# Patient Record
Sex: FEMALE | Race: WHITE | NOT HISPANIC OR LATINO | Employment: OTHER | URBAN - METROPOLITAN AREA
[De-identification: names, ages, dates, MRNs, and addresses within clinical notes are randomized per-mention and may not be internally consistent; named-entity substitution may affect disease eponyms.]

---

## 2017-05-24 NOTE — PATIENT DISCUSSION
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 NO NEW HOLES OR TEARS SEEN ON DILATED EXAM TODAY.  RETINAL DETACHMENT SIGNS AND SYMPTOMS REVIEWED

## 2017-05-24 NOTE — PATIENT DISCUSSION
VISUAL SYMPTOMS HAVE REMAINED STABLE. DISCUSSED MULTIFACTORIAL ETIOLOGY, INCLUDING MILD ERM OU, POSSIBLE EARLY GLAUCOMA OU, AND/OR OPTIC NEUROPATHY. MRI BRAIN/ORBITS WITH DWI NOT REQUIRED AT THIS TIME, WILL DEFER UNLESS SHE DEVELOPS PROGRESSIVE SYMPTOMS CONSISTENT WITH OPTIC NEUROPATHY.

## 2017-05-24 NOTE — PATIENT DISCUSSION
EPIMACULAR MEMBRANE, OU:  PATIENT NOT FUNCTIONALLY BOTHERED. SURGERY NOT RECOMMENDED AT THIS TIME. RETURN AS SCHEDULED FOR OCT / EVALUATION.

## 2017-10-16 NOTE — PATIENT DISCUSSION
Classic Migraine Counseling:  I discussed with the patient the pathophysiology and various clinical presentations of migraine. I discussed migraine therapy in general terms and suggested consultation with the primary care physician for specific treatment recommendations. I reassured the patient that these symptoms are unrelated to any problem related to eye health.

## 2017-10-16 NOTE — PATIENT DISCUSSION
OPHTHALMIC MIGRAINE:  +VISUAL AURAS. DENIES ANY SIGNS/SYMPTOMS OF GCA. RETURN TO PRIMARY CARE DOCTOR FOR MIGRAINE MANAGEMENT AND FOLLOW UP AS SCHEDULED.

## 2017-10-16 NOTE — PATIENT DISCUSSION
RETINA IS ATTACHED OU: PVD OU; LATTICE OS; NO NEW HOLES OR TEARS SEEN ON DILATED EXAM TODAY.  RETINAL DETACHMENT SIGNS AND SYMPTOMS REVIEWED

## 2017-10-16 NOTE — PATIENT DISCUSSION
Continue: Systane (PF) (peg 400-propylene glycol (pf)): dropperette: 0.4-0.3% 1 drop twice a day as needed into both eyes

## 2018-01-23 NOTE — PATIENT DISCUSSION
CANNOT RULE OUT ISCHEMIC OPTIC NEUROPATHY, FOLLOW AS LOW TENSION GLAUCOMA - NORMAL MRI BRAIN LAST OCTOBER.  CONTINUE FU VF

## 2018-07-27 NOTE — PATIENT DISCUSSION
CANNOT RULE OUT ISCHEMIC OPTIC NEUROPATHY, FOLLOW AS LOW TENSION GLAUCOMA - NORMAL MRI BRAIN LAST OCTOBER. CONTINUE FU VF.  GOAL IOP &lt;16

## 2019-01-16 NOTE — PATIENT DISCUSSION
RETINA IS ATTACHED OU: PVD OU; LATTICE DEGENERATION OS; NO HOLES OR TEARS SEEN ON DILATED EXAM TODAY.  RETINAL DETACHMENT SIGNS AND SYMPTOMS REVIEWED

## 2019-07-29 NOTE — PATIENT DISCUSSION
NTG (F/U) Counseling: Intraocular pressure is within acceptable limits. I have reviewed the regimen of glaucoma drops with the patient and have stressed the importance of compliance. Patient instructed to continue current medication and return for follow-up as scheduled.

## 2019-07-29 NOTE — PATIENT DISCUSSION
LATTICE DEGENERATION, OU: NO HOLES, NO TEARS 360 DEGREES WITH INDIRECT EXAM. RETURN FOR FOLLOW-UP AS SCHEDULED.

## 2019-10-28 NOTE — PATIENT DISCUSSION
CANNOT RULE OUT ISCHEMIC OPTIC NEUROPATHY, FOLLOW AS LOW TENSION GLAUCOMA - WILL SEND COPY OF NOTES TO [facility] (PT HAS APPT ON 12TH).  CONSIDER MRI BRAIN WITH DWI IF SYMPTOMS WORSEN
COUNSELING:
GOAL IOP &lt;16
General:
NTG OU: REPEAT VF TESTING SHOWS INFERIOR NONSPECIFIC CHANGES CHANGES OD&gt;OS. PT ALSO ENDORSES NEUROLOGIC SYMPTOMS - DR LANDEROS NEUROLOGIST TREATING FOR NEUROPATHY. NO EVIDENCE OF MS.
NTG, OU Counseling:  I have explained to the patient at length regarding the diagnosis of normal tension glaucoma and its pathophysiology. I have discussed the various treatment options including medications and surgery. I recommend that the patient begin medical treatment. I emphasized to the patient the importance of compliance with treatment and follow-up appointments.
H/O inguinal hernia repair

## 2019-11-05 ENCOUNTER — ESTABLISHED COMPREHENSIVE EXAM (OUTPATIENT)
Dept: URBAN - METROPOLITAN AREA CLINIC 32 | Facility: CLINIC | Age: 69
End: 2019-11-05

## 2019-11-05 DIAGNOSIS — H25.13: ICD-10-CM

## 2019-11-05 PROCEDURE — G9903 PT SCRN TBCO ID AS NON USER: HCPCS

## 2019-11-05 PROCEDURE — 92014 COMPRE OPH EXAM EST PT 1/>: CPT

## 2019-11-05 PROCEDURE — 92015 DETERMINE REFRACTIVE STATE: CPT

## 2019-11-05 PROCEDURE — G8785 BP SCRN NO PERF AT INTERVAL: HCPCS

## 2019-11-05 PROCEDURE — G8428 CUR MEDS NOT DOCUMENT: HCPCS

## 2019-11-05 PROCEDURE — 1036F TOBACCO NON-USER: CPT

## 2019-11-05 ASSESSMENT — VISUAL ACUITY
OS_BAT: 20/400
OD_CC: J2
OD_CC: 20/25-1
OS_CC: 20/25-1
OD_SC: 20/60
OS_CC: J2
OD_BAT: 20/400
OS_SC: J5
OD_SC: J3
OS_SC: 20/70

## 2019-11-05 ASSESSMENT — KERATOMETRY
OS_AXISANGLE_DEGREES: 79
OS_K2POWER_DIOPTERS: 43.25
OD_AXISANGLE2_DEGREES: 15
OD_AXISANGLE_DEGREES: 105
OS_K1POWER_DIOPTERS: 42.25
OD_K2POWER_DIOPTERS: 43.5
OD_K1POWER_DIOPTERS: 42
OS_AXISANGLE2_DEGREES: 169

## 2019-11-05 ASSESSMENT — TONOMETRY
OD_IOP_MMHG: 14
OS_IOP_MMHG: 16

## 2020-06-18 ENCOUNTER — ESTABLISHED COMPREHENSIVE EXAM (OUTPATIENT)
Dept: URBAN - METROPOLITAN AREA CLINIC 32 | Facility: CLINIC | Age: 70
End: 2020-06-18

## 2020-06-18 DIAGNOSIS — H35.363: ICD-10-CM

## 2020-06-18 DIAGNOSIS — E11.9: ICD-10-CM

## 2020-06-18 PROCEDURE — 92134 CPTRZ OPH DX IMG PST SGM RTA: CPT

## 2020-06-18 PROCEDURE — 92014 COMPRE OPH EXAM EST PT 1/>: CPT

## 2020-06-18 ASSESSMENT — KERATOMETRY
OS_K2POWER_DIOPTERS: 43.25
OD_K1POWER_DIOPTERS: 42
OS_AXISANGLE2_DEGREES: 169
OD_K2POWER_DIOPTERS: 43.5
OD_AXISANGLE_DEGREES: 105
OD_AXISANGLE2_DEGREES: 15
OS_AXISANGLE_DEGREES: 79
OS_K1POWER_DIOPTERS: 42.25

## 2020-06-18 ASSESSMENT — TONOMETRY
OD_IOP_MMHG: 12
OS_IOP_MMHG: 14

## 2020-06-18 ASSESSMENT — VISUAL ACUITY
OU_SC: 20/30+2
OS_SC: 20/40-1
OD_SC: 20/40-2

## 2020-10-27 NOTE — PATIENT DISCUSSION
10/27/2020OS+1.75+2.2547+[id number]/20 -&nbsp;SN &nbsp; &nbsp; bmb
CANNOT RULE OUT ISCHEMIC OPTIC NEUROPATHY, FOLLOW AS LOW TENSION GLAUCOMA - NORMAL MRI BRAIN LAST OCTOBER. CONTINUE FU VF.  GOAL IOP &lt;16
COUNSELING:
Comments:ANY COMBO
Continue: Systane (PF) (peg 400-propylene glycol (pf)): dropperette: 0.4-0.3% 1 drop twice a day as needed into both eyes
Dry Eye Syndrome Counseling: I have discussed the diagnosis and the pathophysiology of this disease with the patient. Eyelid pathology and systemic illnesses such as Sjogren?s disease or rheumatoid arthritis may contribute to severity. Vision may be limited by dry eye, and symptoms exacerbated by environmental factors such as smoke, wind, or prolonged eye use. Lifestyle habits and environmental factors contributing to dry eyes have been reviewed with the patient. Daily prescribed and over the counter medications, along with their potential contributions to dry eye symptoms, have been discussed. Treatment options include, but are not limited to, artificial tears, punctal plugs, topical cyclosporine, oral omega-3 supplements, Lipiflow, moisture goggles, and lubricating ointments. I stressed the importance of compliance with treatment.
EPIRETINAL MEMBRANE, OU. NOT VISUALLY SIGNIFICANT TO THE PATIENT AT THIS TIME. FOLLOW.
Epiretinal Membrane Counseling: The diagnosis and natural history of epiretinal membrane was discussed with the patient including the risk of progression with retinal traction resulting in visual distortion. The patient is instructed to call back immediately if any vision changes, and keep follow up as scheduled.
General:
Glasses Prescribed:
HORDEOLUM OS: PRESCRIBED WARM COMPRESSES, EYELID SCRUBS AND EMYCIN.
LATTICE DEGENERATION, OU: NO HOLES, NO TEARS 360 DEGREES WITH INDIRECT EXAM. RETURN FOR FOLLOW-UP AS SCHEDULED.
Lattice Degeneration Counseling: I have discussed the diagnosis and pathophysiology with the patient and the possibility of a retinal tear or detachment. The signs/symptoms of a retinal tear/detachment were reviewed to include but not limited to redness, discharge, pain, increase or change in flashes of light, increase or change in floaters, decreased vision, part of the vision missing, veils or any other ocular concerns. I have further explained not all patients who develop a tear or detachment have notable symptoms, therefore, compliance with return visits are necessary. The patient was instructed to contact us immediately if they notice any of the signs or symptoms of retinal detachment as noted above as the prognosis for any potential treatment options may be time related. Return for follow-up as scheduled.
Lens Material:
MILD DRY EYES: PRESCRIBED ARTIFICIAL TEARS BID - QID, OU RETURN FOR FOLLOW-UP AS SCHEDULED OR SOONER IF SYMPTOMS WORSEN.
Medications:
NTG (F/U) Counseling: Intraocular pressure is within acceptable limits. I have reviewed the regimen of glaucoma drops with the patient and have stressed the importance of compliance. Patient instructed to continue current medication and return for follow-up as scheduled.
NTG OU: GOOD IOP, IMPROVED RNFL OCT. NO EVIDENCE OF MS.
New Prescription: erythromycin (erythromycin): ointment: 5 mg/gram (0.5 %) a small amount at bedtime into both eyes 10-
Progressive - Daily wearOD+1.75+3.70640+[id number]/20&nbsp;SN &nbsp; &nbsp; bmb
Slab Off:No
Vertex Distance:
spherecylinderaxisaddprismvertexVAInt VANVExaminer
32.9

## 2021-04-26 NOTE — PATIENT DISCUSSION
NTG OU: GOOD IOP,  NO EVIDENCE OF MS. CANNOT RULE OUT ISCHEMIC OPTIC NEUROPATHY, FOLLOW AS LOW TENSION GLAUCOMA - NORMAL MRI BRAIN LAST OCTOBER. CONTINUE FU VF.  GOAL IOP &lt;16

## 2021-06-21 ENCOUNTER — ESTABLISHED COMPREHENSIVE EXAM (OUTPATIENT)
Dept: URBAN - METROPOLITAN AREA CLINIC 32 | Facility: CLINIC | Age: 71
End: 2021-06-21

## 2021-06-21 DIAGNOSIS — H43.811: ICD-10-CM

## 2021-06-21 DIAGNOSIS — H25.13: ICD-10-CM

## 2021-06-21 DIAGNOSIS — E11.9: ICD-10-CM

## 2021-06-21 PROCEDURE — 92015 DETERMINE REFRACTIVE STATE: CPT

## 2021-06-21 PROCEDURE — 92014 COMPRE OPH EXAM EST PT 1/>: CPT

## 2021-06-21 ASSESSMENT — VISUAL ACUITY
OD_CC: J2
OS_CC: 20/40
OS_CC: J1
OD_CC: 20/25-1

## 2021-06-21 ASSESSMENT — KERATOMETRY
OD_AXISANGLE_DEGREES: 108
OS_AXISANGLE_DEGREES: 83
OD_K2POWER_DIOPTERS: 43.50
OS_AXISANGLE2_DEGREES: 173
OD_AXISANGLE2_DEGREES: 18
OD_K1POWER_DIOPTERS: 42.25
OS_K1POWER_DIOPTERS: 42.50
OS_K2POWER_DIOPTERS: 43.25

## 2021-06-21 ASSESSMENT — TONOMETRY
OD_IOP_MMHG: 15
OS_IOP_MMHG: 15

## 2021-11-29 NOTE — PATIENT DISCUSSION
Continue: Systane (PF) (peg 400-propylene glycol (pf)): dropperette: 0.4-0.3% 1 drop twice a day as needed into both eyes Universal Safety Interventions

## 2022-02-25 NOTE — PATIENT DISCUSSION
GOOD IOP, NO EVIDENCE OF MS. CANNOT RULE OUT ISCHEMIC OPTIC NEUROPATHY, FOLLOW AS LOW TENSION GLAUCOMA - NORMAL MRI BRAIN LAST OCTOBER. CONTINUE FU VF. GOAL IOP <16.

## 2022-06-04 ENCOUNTER — TELEPHONE ENCOUNTER (OUTPATIENT)
Dept: URBAN - METROPOLITAN AREA CLINIC 68 | Facility: CLINIC | Age: 72
End: 2022-06-04

## 2022-06-05 ENCOUNTER — TELEPHONE ENCOUNTER (OUTPATIENT)
Dept: URBAN - METROPOLITAN AREA CLINIC 68 | Facility: CLINIC | Age: 72
End: 2022-06-05

## 2022-06-05 RX ORDER — RISEDRONATE SODIUM 30.1; 4.9 MG/1; MG/1
TABLET, FILM COATED ORAL
Status: ACTIVE | COMMUNITY
Start: 2010-07-20

## 2022-06-25 ENCOUNTER — TELEPHONE ENCOUNTER (OUTPATIENT)
Age: 72
End: 2022-06-25

## 2022-06-26 ENCOUNTER — TELEPHONE ENCOUNTER (OUTPATIENT)
Age: 72
End: 2022-06-26

## 2022-06-26 RX ORDER — SODIUM PHOSPHATE, MONOBASIC, MONOHYDRATE, SODIUM PHOSPHATE, DIBASIC ANHYDROUS 1.102; .398 G/1; G/1
OSMOPREP(    AS DIRECTED ON PREP SHEET ) ACTIVE -HX ENTRY TABLET ORAL
Status: ACTIVE | COMMUNITY
Start: 2010-07-20

## 2022-06-26 RX ORDER — RISEDRONATE SODIUM 30.1; 4.9 MG/1; MG/1
TABLET, FILM COATED ORAL
Status: ACTIVE | COMMUNITY
Start: 2010-07-20

## 2022-06-26 RX ORDER — ELECTROLYTES/DEXTROSE
SOLUTION, ORAL ORAL
Status: ACTIVE | COMMUNITY
Start: 2010-07-20

## 2022-06-26 RX ORDER — CALCIUM CARBONATE/VITAMIN D3 600 MG-10
TABLET ORAL
Status: ACTIVE | COMMUNITY
Start: 2010-07-20

## 2022-06-27 ENCOUNTER — COMPREHENSIVE EXAM (OUTPATIENT)
Dept: URBAN - METROPOLITAN AREA CLINIC 32 | Facility: CLINIC | Age: 72
End: 2022-06-27

## 2022-06-27 DIAGNOSIS — H25.13: ICD-10-CM

## 2022-06-27 DIAGNOSIS — H02.831: ICD-10-CM

## 2022-06-27 DIAGNOSIS — H02.834: ICD-10-CM

## 2022-06-27 DIAGNOSIS — H43.811: ICD-10-CM

## 2022-06-27 DIAGNOSIS — C44.1092: ICD-10-CM

## 2022-06-27 DIAGNOSIS — H35.363: ICD-10-CM

## 2022-06-27 DIAGNOSIS — E11.9: ICD-10-CM

## 2022-06-27 PROCEDURE — 92014 COMPRE OPH EXAM EST PT 1/>: CPT

## 2022-06-27 ASSESSMENT — KERATOMETRY
OS_K1POWER_DIOPTERS: 43.25
OD_K1POWER_DIOPTERS: 43.25
OS_AXISANGLE2_DEGREES: 80
OS_AXISANGLE_DEGREES: 170
OD_K2POWER_DIOPTERS: 42.00
OS_K2POWER_DIOPTERS: 42.50
OD_AXISANGLE_DEGREES: 15
OD_AXISANGLE2_DEGREES: 105

## 2022-06-27 ASSESSMENT — VISUAL ACUITY
OD_CC: J2
OS_CC: 20/40
OS_CC: J1
OD_CC: 20/30+2
OS_PH: 20/30

## 2022-06-27 ASSESSMENT — TONOMETRY
OS_IOP_MMHG: 16
OD_IOP_MMHG: 10

## 2022-09-08 NOTE — PATIENT DISCUSSION
Educated patient on findings and condition. Discontinue erythromycin wes and prescribe Maxitrol wes qhs OD x 7 days. Additionally prescribe doxycycline 100mg BID po x 7 days. Continue warm compresses BID-TID OU. Advised to call/RTC if si/sx persist or worsen - will refer to Dr. Sherri Frazier or Dr. Kacie Zelaya for evaluation and management. Monitor.

## 2022-09-26 NOTE — PATIENT DISCUSSION
Educated patient on findings and condition. Discontinue erythromycin wes and prescribe Maxitrol wes qhs OD x 7 days. Additionally prescribe doxycycline 100mg BID po x 7 days. Continue warm compresses BID-TID OU. Advised to call/RTC if si/sx persist or worsen - will refer to Dr. Annette Watson or Dr. Kei Ponce for evaluation and management. Monitor.

## 2022-10-03 NOTE — PATIENT DISCUSSION
Discussed diagnosis in detail with patient. Discussed treatment options with patient. Patient instructed to apply warm compresses to the lids followed by lid massage. Sunil's baby shampoo lash rinse advised. Continue Emycin wes until out.

## 2023-09-12 ENCOUNTER — COMPREHENSIVE EXAM (OUTPATIENT)
Dept: URBAN - METROPOLITAN AREA CLINIC 32 | Facility: CLINIC | Age: 73
End: 2023-09-12

## 2023-09-12 DIAGNOSIS — H02.831: ICD-10-CM

## 2023-09-12 DIAGNOSIS — H35.363: ICD-10-CM

## 2023-09-12 DIAGNOSIS — H43.813: ICD-10-CM

## 2023-09-12 DIAGNOSIS — H02.834: ICD-10-CM

## 2023-09-12 DIAGNOSIS — H25.13: ICD-10-CM

## 2023-09-12 DIAGNOSIS — E11.9: ICD-10-CM

## 2023-09-12 PROCEDURE — 99214 OFFICE O/P EST MOD 30 MIN: CPT

## 2023-09-12 PROCEDURE — 92015 DETERMINE REFRACTIVE STATE: CPT

## 2023-09-12 ASSESSMENT — KERATOMETRY
OS_AXISANGLE2_DEGREES: 80
OS_AXISANGLE_DEGREES: 170
OD_AXISANGLE_DEGREES: 15
OS_K1POWER_DIOPTERS: 43.25
OD_AXISANGLE2_DEGREES: 105
OD_K2POWER_DIOPTERS: 42.00
OS_K2POWER_DIOPTERS: 42.50
OD_K1POWER_DIOPTERS: 43.25

## 2023-09-12 ASSESSMENT — VISUAL ACUITY
OS_GLARE: 20/50
OS_SC: 20/80
OS_CC: 20/50+2
OD_CC: 20/40
OD_GLARE: 20/50
OD_CC: J2
OS_CC: J2
OD_SC: 20/100

## 2023-09-12 ASSESSMENT — TONOMETRY
OD_IOP_MMHG: 13
OS_IOP_MMHG: 13